# Patient Record
Sex: FEMALE | Race: BLACK OR AFRICAN AMERICAN | Employment: UNEMPLOYED | URBAN - NONMETROPOLITAN AREA
[De-identification: names, ages, dates, MRNs, and addresses within clinical notes are randomized per-mention and may not be internally consistent; named-entity substitution may affect disease eponyms.]

---

## 2017-11-17 ENCOUNTER — HOSPITAL ENCOUNTER (EMERGENCY)
Age: 37
Discharge: HOME | End: 2017-11-17
Attending: EMERGENCY MEDICINE
Payer: MEDICARE

## 2017-11-17 VITALS
TEMPERATURE: 98.6 F | OXYGEN SATURATION: 99 % | RESPIRATION RATE: 16 BRPM | BODY MASS INDEX: 46.98 KG/M2 | SYSTOLIC BLOOD PRESSURE: 141 MMHG | HEART RATE: 114 BPM | DIASTOLIC BLOOD PRESSURE: 99 MMHG | WEIGHT: 278 LBS

## 2017-11-17 DIAGNOSIS — L73.2 AXILLARY HIDRADENITIS SUPPURATIVA: Primary | ICD-10-CM

## 2017-11-17 PROCEDURE — 99282 EMERGENCY DEPT VISIT SF MDM: CPT

## 2017-11-17 RX ORDER — CEPHALEXIN 500 MG/1
500 CAPSULE ORAL 2 TIMES DAILY
Qty: 20 CAPSULE | Refills: 0 | Status: SHIPPED | OUTPATIENT
Start: 2017-11-17 | End: 2017-11-24

## 2017-11-17 RX ORDER — SULFAMETHOXAZOLE AND TRIMETHOPRIM 800; 160 MG/1; MG/1
1 TABLET ORAL 2 TIMES DAILY
Qty: 14 TABLET | Refills: 0 | Status: SHIPPED | OUTPATIENT
Start: 2017-11-17 | End: 2017-11-24

## 2017-11-17 RX ORDER — HYDROCODONE BITARTRATE AND ACETAMINOPHEN 5; 325 MG/1; MG/1
1 TABLET ORAL EVERY 6 HOURS PRN
Qty: 14 TABLET | Refills: 0 | Status: SHIPPED | OUTPATIENT
Start: 2017-11-17 | End: 2017-11-24

## 2017-11-17 RX ORDER — OXYCODONE HYDROCHLORIDE AND ACETAMINOPHEN 5; 325 MG/1; MG/1
1 TABLET ORAL ONCE
Status: DISCONTINUED | OUTPATIENT
Start: 2017-11-17 | End: 2017-11-17 | Stop reason: HOSPADM

## 2017-11-17 ASSESSMENT — PAIN SCALES - GENERAL: PAINLEVEL_OUTOF10: 8

## 2017-11-17 ASSESSMENT — PAIN DESCRIPTION - ORIENTATION: ORIENTATION: RIGHT;LEFT

## 2017-11-17 ASSESSMENT — PAIN DESCRIPTION - FREQUENCY: FREQUENCY: CONTINUOUS

## 2017-11-17 ASSESSMENT — PAIN DESCRIPTION - PAIN TYPE: TYPE: ACUTE PAIN

## 2017-11-17 ASSESSMENT — PAIN DESCRIPTION - DESCRIPTORS: DESCRIPTORS: SHARP

## 2017-11-17 ASSESSMENT — PAIN DESCRIPTION - PROGRESSION: CLINICAL_PROGRESSION: GRADUALLY WORSENING

## 2017-11-17 ASSESSMENT — PAIN DESCRIPTION - ONSET: ONSET: PROGRESSIVE

## 2017-11-17 ASSESSMENT — PAIN DESCRIPTION - LOCATION: LOCATION: ARM

## 2017-11-17 NOTE — ED PROVIDER NOTES
[facility] EMERGENCY DEPT      CHIEF COMPLAINT       Chief Complaint   Patient presents with    Abscess     pt with c/o bilat abscess to axillary area   c/o pain to both areas       Nurses Notes reviewed and I agree except as noted in the HPI. HISTORY OF PRESENT ILLNESS    Elena Meza is a 40 y.o. female who presents with complaint of abscesses to bilateral armpits. Patient has had the abscesses for about a week. She denies a fever chills. Stated that she's had the abscess to that region on and off for years. Onset: Acute on chronic  Duration: about a week  Timing: constant  Location of Pain: bilateral armpit  Intesity/severity: moderate  Modifying Factors: none  Relieved by;  Previous Episodes; Tx Before arrival: none  REVIEW OF SYSTEMS      Review of Systems   Constitutional: Negative for fever, chills, diaphoresis and fatigue. HENT: Negative for congestion, drooling, facial swelling and sore throat. Eyes: Negative for photophobia, pain and discharge. Respiratory: Negative for cough, shortness of breath, wheezing and stridor. Cardiovascular: Negative for chest pain, palpitations and leg swelling. Gastrointestinal: Negative for abdominal pain, blood in stool and abdominal distention. Genitourinary: Negative for dysuria, urgency, hematuria and difficulty urinating. Musculoskeletal: Negative for gait problem, neck pain and neck stiffness. Skin; No rash, No itching; positive for abscesses to bilateral armpits. Neurological: Negative for seizures, weakness and numbness. Hematological: Negative for adenopathy. Does not bruise/bleed easily. Psychiatric/Behavioral: Negative for hallucinations, confusion and agitation. PAST MEDICAL HISTORY    has a past medical history of Asthma and Unspecified cerebral artery occlusion with cerebral infarction. SURGICAL HISTORY      has a past surgical history that includes Tonsillectomy (1999).     CURRENT MEDICATIONS       Discharge Medication List as of 2017  2:02 AM      CONTINUE these medications which have NOT CHANGED    Details   ibuprofen (ADVIL;MOTRIN) 800 MG tablet Take 1 tablet by mouth every 8 hours as needed for Pain, Disp-30 tablet, R-0      lidocaine (LIDODERM) 5 % Place 1 patch onto the skin daily 12 hours on, 12 hours off., Disp-30 patch, R-0      Nebulizers (COMPRESSOR/NEBULIZER) MISC 4 TIMES DAILY PRN Starting 2014, Until Discontinued, Disp-1 each, R-3, Print      ipratropium-albuterol (DUONEB) 0.5-2.5 (3) MG/3ML SOLN nebulizer solution Inhale 3 mLs into the lungs every 4 hours as needed for Shortness of Breath., Disp-360 mL, R-0      fluticasone (FLONASE) 50 MCG/ACT nasal spray 1 spray by Nasal route daily. , Disp-1 Bottle, R-0      ALBUTEROL SULFATE HFA IN Inhale  into the lungs. nicotine (NICODERM CQ) 14 MG/24HR Place 1 patch onto the skin every 24 hours. , Disp-30 patch, R-3             ALLERGIES     is allergic to pcn [penicillins]. FAMILY HISTORY     indicated that her mother is alive. She indicated that her father is . She indicated that her sister is alive. She indicated that her maternal grandmother is alive. family history includes Cancer in her maternal grandfather; Diabetes in her mother; High Blood Pressure in her mother; Substance Abuse in her father. SOCIAL HISTORY      reports that she has been smoking Cigarettes. She has a 8.00 pack-year smoking history. She has never used smokeless tobacco. She reports that she drinks alcohol. She reports that she uses drugs, including Marijuana. PHYSICAL EXAM     INITIAL VITALS:  weight is 278 lb (126.1 kg). Her temperature is 98.6 °F (37 °C). Her blood pressure is 141/99 (abnormal) and her pulse is 114. Her respiration is 16 and oxygen saturation is 99%. Physical Exam   Constitutional:  well-developed and well-nourished.    HENT: Head: Normocephalic, atraumatic, Bilateral external ears normal, Oropharynx mosit, No oral exudates, Nose normal.   Eyes: PERRL, EOMI, Conjunctiva normal, No discharge. No scleral icterus  Neck: Normal range of motion, No tenderness, Supple  Lympatics: No lymphadenopathy. Cardiovascular: Normal rate, regular rhythm, S1 normal and S2 normal.  Exam reveals no gallop. Pulmonary/Chest: Effort normal and breath sounds normal. No accessory muscle usage or stridor. No respiratory distress. no wheezes. has no rales. exhibits no tenderness. Abdominal: Soft. Bowel sounds are normal.  exhibits no distension. There is no tenderness. There is no rebound and no guarding. Extremities: No edema, no tenderness, no cyanosis, no clubbing. Musculoskeletal: Good range of motion in major joints is observed. No major deformities noted. Neurological: Alert and oriented ×3, normal motor function, normal sensory function, no focal deficits. GCS 15  Skin: Skin is warm, dry and intact. No rash noted. No erythema. positive for Hydranitis suppurativa to bilateral armpits     DIFFERENTIAL DIAGNOSIS:       DIAGNOSTIC RESULTS     EKG: All EKG's are interpreted by the Emergency Department Physician who either signs or Co-signs this chart in the absence of a cardiologist.      RADIOLOGY: non-plain film images(s) such as CT, Ultrasound and MRI are read by the radiologist.  Plain radiographic images are visualized and preliminarily interpreted by the emergency physician unless otherwise stated below. LABS:   Labs Reviewed - No data to display    EMERGENCY DEPARTMENT COURSE:   Vitals:    Vitals:    11/17/17 0119 11/17/17 0122   BP: (!) 141/99    Pulse: 114    Resp: 16    Temp: 98.6 °F (37 °C)    SpO2: 99%    Weight:  278 lb (126.1 kg)         CRITICAL CARE:       CONSULTS:  None    PROCEDURES:  None    FINAL IMPRESSION      1.  Axillary hidradenitis suppurativa          DISPOSITION/PLAN   Decision To Discharge  Axillary hidradenitis suppurativa noted bilaterally, patient will be treated with pain medication and antibiotics: Patient referred to general surgery for further management.   PATIENT REFERRED TO:  Mike Richey MD  [facility] [address]  [address]  [place] [id number]  [phone]    On 11/17/2017  RE-CHECK AND FURTHER TESTING AS NEEDED      DISCHARGE MEDICATIONS:  Discharge Medication List as of 11/17/2017  2:02 AM      START taking these medications    Details   cephALEXin (KEFLEX) 500 MG capsule Take 1 capsule by mouth 2 times daily for 7 days, Disp-20 capsule, R-0Print      sulfamethoxazole-trimethoprim (BACTRIM DS) 800-160 MG per tablet Take 1 tablet by mouth 2 times daily for 7 days, Disp-14 tablet, R-0Print      HYDROcodone-acetaminophen (NORCO) 5-325 MG per tablet Take 1 tablet by mouth every 6 hours as needed for Pain ., Disp-14 tablet, R-0Print             (Please note that portions of this note were completed with a voice recognition program.  Efforts were made to edit the dictations but occasionally words are mis-transcribed.)    Shabbir Anthony, [address] [place] [address],   11/17/17 [id number]

## 2019-09-25 ENCOUNTER — NURSE ONLY (OUTPATIENT)
Dept: LAB | Age: 39
End: 2019-09-25

## 2021-09-21 ENCOUNTER — HOSPITAL ENCOUNTER (EMERGENCY)
Age: 41
Discharge: HOME | End: 2021-09-21
Payer: MEDICARE

## 2021-09-21 ENCOUNTER — APPOINTMENT (OUTPATIENT)
Dept: GENERAL RADIOLOGY | Age: 41
End: 2021-09-21
Payer: MEDICARE

## 2021-09-21 VITALS
SYSTOLIC BLOOD PRESSURE: 170 MMHG | HEIGHT: 65 IN | HEART RATE: 94 BPM | WEIGHT: 275 LBS | BODY MASS INDEX: 45.82 KG/M2 | RESPIRATION RATE: 18 BRPM | TEMPERATURE: 97.3 F | OXYGEN SATURATION: 97 % | DIASTOLIC BLOOD PRESSURE: 72 MMHG

## 2021-09-21 DIAGNOSIS — N30.01 ACUTE CYSTITIS WITH HEMATURIA: Primary | ICD-10-CM

## 2021-09-21 DIAGNOSIS — K02.9 DENTAL DECAY: ICD-10-CM

## 2021-09-21 LAB
BILIRUBIN URINE: NEGATIVE
BLOOD, URINE: ABNORMAL
CHARACTER, URINE: CLEAR
COLOR: ABNORMAL
GLUCOSE URINE: NEGATIVE MG/DL
KETONES, URINE: NEGATIVE
LEUKOCYTE ESTERASE, URINE: ABNORMAL
NITRITE, URINE: NEGATIVE
PH, URINE: 5.5 (ref 5–9)
PREGNANCY, URINE: NEGATIVE
PROTEIN, URINE: 100 MG/DL
SPECIFIC GRAVITY, URINE: 1.02 (ref 1–1.03)
UROBILINOGEN, URINE: 0.2 EU/DL (ref 0.2–1)

## 2021-09-21 PROCEDURE — 99202 OFFICE O/P NEW SF 15 MIN: CPT | Performed by: NURSE PRACTITIONER

## 2021-09-21 PROCEDURE — U0003 INFECTIOUS AGENT DETECTION BY NUCLEIC ACID (DNA OR RNA); SEVERE ACUTE RESPIRATORY SYNDROME CORONAVIRUS 2 (SARS-COV-2) (CORONAVIRUS DISEASE [COVID-19]), AMPLIFIED PROBE TECHNIQUE, MAKING USE OF HIGH THROUGHPUT TECHNOLOGIES AS DESCRIBED BY CMS-2020-01-R: HCPCS

## 2021-09-21 PROCEDURE — 81025 URINE PREGNANCY TEST: CPT

## 2021-09-21 PROCEDURE — 74018 RADEX ABDOMEN 1 VIEW: CPT

## 2021-09-21 PROCEDURE — 87086 URINE CULTURE/COLONY COUNT: CPT

## 2021-09-21 PROCEDURE — 81003 URINALYSIS AUTO W/O SCOPE: CPT

## 2021-09-21 PROCEDURE — 99203 OFFICE O/P NEW LOW 30 MIN: CPT

## 2021-09-21 PROCEDURE — U0005 INFEC AGEN DETEC AMPLI PROBE: HCPCS

## 2021-09-21 RX ORDER — LACTULOSE 10 G/15ML
20 SOLUTION ORAL 3 TIMES DAILY
Qty: 270 ML | Refills: 0 | Status: SHIPPED | OUTPATIENT
Start: 2021-09-21 | End: 2021-09-24

## 2021-09-21 RX ORDER — NITROFURANTOIN 25; 75 MG/1; MG/1
100 CAPSULE ORAL 2 TIMES DAILY
Qty: 10 CAPSULE | Refills: 0 | Status: SHIPPED | OUTPATIENT
Start: 2021-09-21 | End: 2021-09-26

## 2021-09-21 RX ORDER — CHLORHEXIDINE GLUCONATE 0.12 MG/ML
15 RINSE ORAL 2 TIMES DAILY
Qty: 420 ML | Refills: 0 | Status: SHIPPED | OUTPATIENT
Start: 2021-09-21 | End: 2021-10-05

## 2021-09-21 ASSESSMENT — ENCOUNTER SYMPTOMS
CONSTIPATION: 1
SHORTNESS OF BREATH: 0
HEMATOCHEZIA: 1
VOMITING: 0
NAUSEA: 1
FLATUS: 0
DIARRHEA: 1
HEMATEMESIS: 0
COUGH: 0
BELCHING: 0
ABDOMINAL PAIN: 1
SORE THROAT: 0

## 2021-09-21 ASSESSMENT — PAIN DESCRIPTION - ONSET: ONSET: ON-GOING

## 2021-09-21 ASSESSMENT — PAIN DESCRIPTION - FREQUENCY: FREQUENCY: INTERMITTENT

## 2021-09-21 ASSESSMENT — PAIN DESCRIPTION - PROGRESSION: CLINICAL_PROGRESSION: NOT CHANGED

## 2021-09-21 ASSESSMENT — PAIN SCALES - GENERAL: PAINLEVEL_OUTOF10: 8

## 2021-09-21 ASSESSMENT — PAIN DESCRIPTION - DESCRIPTORS: DESCRIPTORS: ACHING

## 2021-09-21 ASSESSMENT — PAIN DESCRIPTION - LOCATION: LOCATION: ABDOMEN

## 2021-09-21 NOTE — ED PROVIDER NOTES
[facility]  Urgent Care Encounter      CHIEF COMPLAINT       Chief Complaint   Patient presents with    Abdominal Pain       Nurses Notes reviewed and I agree except as noted in the HPI. HISTORY OFPRESENT ILLNESS   Paulette Lanes is a 36 y.o. The history is provided by the patient. No  was used. Abdominal Pain  Pain location:  Epigastric  Pain quality: fullness, heavy and pressure    Pain radiates to:  Does not radiate  Pain severity:  Moderate  Onset quality:  Sudden  Timing:  Constant  Progression:  Unchanged  Chronicity:  New  Context: sick contacts    Context: not alcohol use, not awakening from sleep, not diet changes, not eating, not laxative use, not medication withdrawal, not previous surgeries, not recent illness, not recent sexual activity, not recent travel, not retching, not suspicious food intake and not trauma    Context comment:  Nephew is being tested for covid  Relieved by:  Nothing  Worsened by:  Nothing  Ineffective treatments:  Eating and movement  Associated symptoms: constipation, diarrhea, hematochezia, hematuria and nausea    Associated symptoms: no anorexia, no belching, no chest pain, no chills, no cough, no dysuria, no fatigue, no fever, no flatus, no hematemesis, no melena, no shortness of breath, no sore throat, no vaginal bleeding, no vaginal discharge and no vomiting    Associated symptoms comment:  Frequency  Risk factors: obesity    Risk factors: no alcohol abuse, no aspirin use, not elderly, has not had multiple surgeries, no NSAID use, not pregnant and no recent hospitalization        REVIEW OF SYSTEMS     Review of Systems   Constitutional: Negative for chills, fatigue and fever. HENT: Negative for sore throat. Respiratory: Negative for cough and shortness of breath. Cardiovascular: Negative for chest pain. Gastrointestinal: Positive for abdominal pain, constipation, diarrhea, hematochezia and nausea.  Negative for anorexia, flatus, hematemesis, melena and vomiting. Genitourinary: Positive for hematuria. Negative for dysuria, vaginal bleeding and vaginal discharge. PAST MEDICAL HISTORY         Diagnosis Date    Asthma     Unspecified cerebral artery occlusion with cerebral infarction        SURGICAL HISTORY     Patient  has a past surgical history that includes Tonsillectomy (1999). CURRENT MEDICATIONS       Discharge Medication List as of 9/21/2021  5:33 PM      CONTINUE these medications which have NOT CHANGED    Details   ipratropium-albuterol (DUONEB) 0.5-2.5 (3) MG/3ML SOLN nebulizer solution Inhale 3 mLs into the lungs every 4 hours as needed for Shortness of Breath., Disp-360 mL, R-0      ALBUTEROL SULFATE HFA IN Inhale  into the lungs. ibuprofen (ADVIL;MOTRIN) 800 MG tablet Take 1 tablet by mouth every 8 hours as needed for Pain, Disp-30 tablet, R-0      Nebulizers (COMPRESSOR/NEBULIZER) MISC 4 TIMES DAILY PRN Starting 1/21/2014, Until Discontinued, Disp-1 each, R-3, Print      fluticasone (FLONASE) 50 MCG/ACT nasal spray 1 spray by Nasal route daily. , Disp-1 Bottle, R-0             ALLERGIES     Patient is is allergic to pcn [penicillins]. FAMILY HISTORY     Patient's family history includes Cancer in her maternal grandfather; Diabetes in her mother; High Blood Pressure in her mother; Substance Abuse in her father. SOCIAL HISTORY     Patient  reports that she has been smoking cigarettes. She has a 8.00 pack-year smoking history. She has never used smokeless tobacco. She reports current alcohol use. She reports current drug use. Drug: Marijuana. PHYSICAL EXAM     ED TRIAGE VITALS  BP: (!) 170/72, Temp: 97.3 °F (36.3 °C), Pulse: 94, Resp: 18, SpO2: 97 %  Physical Exam  Vitals and nursing note reviewed. Constitutional:       General: She is not in acute distress. Appearance: She is well-developed. She is obese. She is not ill-appearing, toxic-appearing or diaphoretic.    HENT:      Head: Normocephalic and atraumatic. Pulmonary:      Effort: Pulmonary effort is normal. No respiratory distress. Breath sounds: Normal breath sounds. No stridor. No wheezing, rhonchi or rales. Chest:      Chest wall: No tenderness. Abdominal:      General: Abdomen is protuberant. Bowel sounds are increased. There is no distension or abdominal bruit. There are no signs of injury. Palpations: Abdomen is soft. Tenderness: There is abdominal tenderness in the right upper quadrant and left upper quadrant. There is left CVA tenderness. There is no guarding or rebound. Skin:     General: Skin is warm. Neurological:      General: No focal deficit present. Mental Status: She is alert and oriented to person, place, and time. Psychiatric:         Mood and Affect: Mood normal.         Behavior: Behavior normal.         DIAGNOSTIC RESULTS   Labs:  Results for orders placed or performed during the hospital encounter of 09/21/21   Urinalysis   Result Value Ref Range    Glucose, Ur Negative NEGATIVE mg/dl    Bilirubin Urine Negative NEGATIVE    Ketones, Urine Negative NEGATIVE    Specific Gravity, Urine 1.020 1.002 - 1.030    Blood, Urine Large (A) NEGATIVE    pH, Urine 5.50 5.0 - 9.0    Protein, Urine 100 (A) NEGATIVE mg/dl    Urobilinogen, Urine 0.20 0.2 - 1.0 eu/dl    Nitrite, Urine Negative NEGATIVE    Leukocyte Esterase, Urine Trace (A) NEGATIVE    Color, UA Karolyn (A) STRAW-YELLOW    Character, Urine Clear CLEAR-SL CLOUD   Pregnancy, Urine   Result Value Ref Range    Pregnancy, Urine NEGATIVE NEGATIVE       IMAGING:  XR ABDOMEN (KUB) (SINGLE AP VIEW)   Final Result      Nonobstructive bowel gas pattern.       Final report electronically signed by Dr. Soham Diamond on 9/21/2021 5:27 PM        URGENT CARE COURSE:     Vitals:    09/21/21 1605   BP: (!) 170/72   Pulse: 94   Resp: 18   Temp: 97.3 °F (36.3 °C)   TempSrc: Temporal   SpO2: 97%   Weight: 275 lb (124.7 kg)   Height: 5' 5\" (1.651 m) Medications - No data to display  PROCEDURES:  None  FINAL IMPRESSION      1. Acute cystitis with hematuria    2. Dental decay        DISPOSITION/PLAN   Decision To Discharge       Patient or Patient designated representative was advised to drink plenty of water or fluids and take medication as prescribed. The patient or Patient designated representative is advised to monitor for any changes in pain, development of high fever, chills, persistent vomiting, development of increasing back or flank pain or increase in hematuria the patient is advised to go to the emergency department for reevaluation and further follow-up if they wouldn't notice any of the above symptoms. The patient or Patient designated representative was also advised to follow up with family doctor or primary care provider after the antibiotic is completed for repeat urinalysis. The patient did verbalize understanding of discharge instructions and is agreeable to the treatment plan. The patient left ambulatory without any changes or concerns in stable condition.     PATIENT REFERRED TO:  MADAI Willett CNP  [address] [address]  [phone]    Schedule an appointment as soon as possible for a visit       DISCHARGE MEDICATIONS:  Discharge Medication List as of 9/21/2021  5:33 PM      START taking these medications    Details   nitrofurantoin, macrocrystal-monohydrate, (MACROBID) 100 MG capsule Take 1 capsule by mouth 2 times daily for 5 days, Disp-10 capsule, R-0Normal      chlorhexidine (PERIDEX) 0.12 % solution Take 15 mLs by mouth 2 times daily for 14 days, Disp-420 mL, R-0Normal      lactulose (CHRONULAC) 10 GM/15ML solution Take 30 mLs by mouth 3 times daily for 3 days, Disp-270 mL, R-0Normal           Discharge Medication List as of 9/21/2021  5:33 PM          Nonda JusMADAI nascimento CNP          Nonda JusMADAI CNP  09/21/21 9667

## 2021-09-21 NOTE — ED TRIAGE NOTES
To room with c/o  right and left  Upper abdominal discomfort for 1 1/2 days. Vaginal Spotting today 1 1/2 week early today. Diarrhea for 3 weeks before now, stool sample last week. No nausea or vomiting. Appetite \"all right\".

## 2021-09-22 ENCOUNTER — CARE COORDINATION (OUTPATIENT)
Dept: CARE COORDINATION | Age: 41
End: 2021-09-22

## 2021-09-22 NOTE — CARE COORDINATION
Educated patient about risk for severe COVID-19 due to risk factors according to CDC guidelines. ACM reviewed discharge instructions, medical action plan and red flag symptoms with the patient who verbalized understanding. Discussed COVID vaccination status: Yes. Education provided on COVID-19 vaccination as appropriate. Discussed exposure protocols and quarantine with CDC Guidelines. Patient was given an opportunity to verbalize any questions and concerns and agrees to contact ACM or health care provider for questions related to their healthcare. Was tested for COVID. Results pending at this time. Has My Chart and is aware to monitor for test results.

## 2021-09-23 ENCOUNTER — CARE COORDINATION (OUTPATIENT)
Dept: CARE COORDINATION | Age: 41
End: 2021-09-23

## 2021-09-23 LAB
ORGANISM: ABNORMAL
SARS-COV-2: NOT DETECTED
SOURCE: NORMAL
URINE CULTURE, ROUTINE: ABNORMAL